# Patient Record
Sex: FEMALE | URBAN - METROPOLITAN AREA
[De-identification: names, ages, dates, MRNs, and addresses within clinical notes are randomized per-mention and may not be internally consistent; named-entity substitution may affect disease eponyms.]

---

## 2020-11-20 ENCOUNTER — OFFICE VISIT (OUTPATIENT)
Dept: URBAN - METROPOLITAN AREA CLINIC 48 | Facility: CLINIC | Age: 30
End: 2020-11-20
Payer: COMMERCIAL

## 2020-11-20 PROCEDURE — 92004 COMPRE OPH EXAM NEW PT 1/>: CPT | Performed by: OPTOMETRIST

## 2020-11-20 PROCEDURE — 92133 CPTRZD OPH DX IMG PST SGM ON: CPT | Performed by: OPTOMETRIST

## 2020-11-20 PROCEDURE — 92083 EXTENDED VISUAL FIELD XM: CPT | Performed by: OPTOMETRIST

## 2020-11-20 ASSESSMENT — INTRAOCULAR PRESSURE
OD: 12
OS: 14

## 2020-11-20 NOTE — IMPRESSION/PLAN
Impression: Unspecified papilledema: H47.10. Plan: Recent weight gain, not on birth control, occasional tinnitus in left ear, headaches.  test: 
OD: 100% all quadrants. OS: 100% all quadrants except inferior left - 90% Sent patient to ER for MRI w and w/o contrast, MRV, if nothing founds, patient needs LP. Our results to be sent to Dr Dontrell Chand.  
RTC 2wk w/ Dr. Suellen Dasilva

## 2020-11-23 ENCOUNTER — OFFICE VISIT (OUTPATIENT)
Dept: URBAN - METROPOLITAN AREA CLINIC 48 | Facility: CLINIC | Age: 30
End: 2020-11-23
Payer: COMMERCIAL

## 2020-11-23 DIAGNOSIS — H47.10 UNSPECIFIED PAPILLEDEMA: Primary | ICD-10-CM

## 2020-11-23 PROCEDURE — 92012 INTRM OPH EXAM EST PATIENT: CPT | Performed by: OPTOMETRIST

## 2020-11-23 NOTE — IMPRESSION/PLAN
Impression: Unspecified papilledema: H47.10. Talked to Dr. Tanya Jaramillo on the phone today and he agrees with plan. Plan: Disc with patient. D/C Diamox per Dr. Tanya Jaramillo. Keep appointment on 12/8/2020 with Dr. Marlee Mao Patient to schedule appointment with PCP Dr. Andre Kyle

## 2020-12-08 ENCOUNTER — OFFICE VISIT (OUTPATIENT)
Dept: URBAN - METROPOLITAN AREA CLINIC 48 | Facility: CLINIC | Age: 30
End: 2020-12-08
Payer: COMMERCIAL

## 2020-12-08 DIAGNOSIS — G93.2 PSEUDOTUMOR: Primary | ICD-10-CM

## 2020-12-08 PROCEDURE — 92133 CPTRZD OPH DX IMG PST SGM ON: CPT | Performed by: OPHTHALMOLOGY

## 2020-12-08 PROCEDURE — 92012 INTRM OPH EXAM EST PATIENT: CPT | Performed by: OPHTHALMOLOGY

## 2020-12-08 ASSESSMENT — INTRAOCULAR PRESSURE
OD: 12
OS: 15

## 2020-12-08 NOTE — IMPRESSION/PLAN
Impression: Pseudotumor: G93.2. Plan: Recommend weight loss 10-20%  of body weight. Head aches have gone away. Continue Furosemide and potassium RTC 4  weeks VF 24-2 and Ishihara color test. ( long Exam)